# Patient Record
Sex: FEMALE | Race: WHITE | NOT HISPANIC OR LATINO
[De-identification: names, ages, dates, MRNs, and addresses within clinical notes are randomized per-mention and may not be internally consistent; named-entity substitution may affect disease eponyms.]

---

## 2023-04-10 PROBLEM — Z00.00 ENCOUNTER FOR PREVENTIVE HEALTH EXAMINATION: Status: ACTIVE | Noted: 2023-04-10

## 2023-06-01 ENCOUNTER — APPOINTMENT (OUTPATIENT)
Dept: ELECTROPHYSIOLOGY | Facility: CLINIC | Age: 68
End: 2023-06-01
Payer: MEDICARE

## 2023-06-01 ENCOUNTER — NON-APPOINTMENT (OUTPATIENT)
Age: 68
End: 2023-06-01

## 2023-06-01 VITALS
HEART RATE: 76 BPM | HEIGHT: 64 IN | WEIGHT: 137 LBS | SYSTOLIC BLOOD PRESSURE: 110 MMHG | OXYGEN SATURATION: 98 % | DIASTOLIC BLOOD PRESSURE: 64 MMHG | BODY MASS INDEX: 23.39 KG/M2 | TEMPERATURE: 98.3 F

## 2023-06-01 DIAGNOSIS — I47.20 VENTRICULAR TACHYCARDIA, UNSPECIFIED: ICD-10-CM

## 2023-06-01 DIAGNOSIS — I44.7 LEFT BUNDLE-BRANCH BLOCK, UNSPECIFIED: ICD-10-CM

## 2023-06-01 PROCEDURE — 99205 OFFICE O/P NEW HI 60 MIN: CPT

## 2023-06-01 PROCEDURE — 93000 ELECTROCARDIOGRAM COMPLETE: CPT

## 2023-06-01 NOTE — REVIEW OF SYSTEMS
[Palpitations] : palpitations [Syncope] : syncope [Negative] : Heme/Lymph [SOB] : no shortness of breath [Dyspnea on exertion] : not dyspnea during exertion [Chest Discomfort] : no chest discomfort [Lower Ext Edema] : no extremity edema [Leg Claudication] : no intermittent leg claudication [Dizziness] : no dizziness [Convulsions] : no convulsions

## 2023-06-01 NOTE — CARDIOLOGY SUMMARY
[de-identified] : 6/1/21 sinus rhythm 86 bpm, RBBB, LAFB [de-identified] : Stress 4/11/23 normal nuclear stress test. Normal LVEF 78%, LA 3.3cm, trace TR, RVSP <25  [de-identified] : 4/10/23 LVEF 57%, LA 3.3cm, trace TR, RVSP <25

## 2023-06-01 NOTE — HISTORY OF PRESENT ILLNESS
[FreeTextEntry1] : 67 year old woman with history of nephrotic syndrome (IgA nephropathy), RBBB/LAFB, presenting for evaluation of palpitation, PVCs, and NSVT.  \par \par She has noted occasional episodes of palpitation with brief periods of racing HR over the last 12 years.  \par \par More recently over the last few months she has noted sensation of irregular pounding, that can be on and off over the day. .  \par \par Event monitor 3/22- 3/29/23 reveals sinus rhythm with average HR 94 (range  bpm). There was one episode of rapid WCT likely NSVT with rate 162-193 bpm. There was 2.5% PVCs with brief periods of bigeminy and trigeminy, and brief episodes of pAT up to 8 beats. The symptom events did appear to correlate with periods of frequent PVCs, and the PVC morphology was monomorphic, and similar to the morphology seen during NSVT. The PVC burden was variable, ranging from 0.8% to 6%.  \par \par She is bothered by the palpitation, but denies associated dizziness or syncope. She has thought chocolate was a trigger for the PVCs.  \par \par TTE and stress test were normal with LVEF 65% and no significant ischemia.  \par \par She was started on Toprol 25mg qd, but has not noted a difference yet- she will be increasing to 50mg qd.  \par \par  She did have one episode of syncope about 4 years ago that occurred on a hot and stressful day, when she was working a lot- at the end of the day she felt dizzy and passed out. 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  \par \par Of note her father had a cardiomyopathy and had an ICD in his 70s, but had no known ICD shcoks.  \par \par Her ECG today reveals  sinus rhythm 86 bpm with RBBB and LAFB, with no ectopy.  \par \par \par Current meds include Toprol 25, benzonatate and Xanax prn.

## 2023-06-01 NOTE — PHYSICAL EXAM
[Well Developed] : well developed [Well Nourished] : well nourished [No Acute Distress] : no acute distress [Normal Conjunctiva] : normal conjunctiva [Normal Venous Pressure] : normal venous pressure [Normal S1, S2] : normal S1, S2 [No Murmur] : no murmur [Clear Lung Fields] : clear lung fields [Good Air Entry] : good air entry [No Respiratory Distress] : no respiratory distress  [Non Tender] : non-tender [Normal Gait] : normal gait [No Edema] : no edema [No Cyanosis] : no cyanosis [No Clubbing] : no clubbing [Moves all extremities] : moves all extremities [No Focal Deficits] : no focal deficits [Normal Speech] : normal speech [Alert and Oriented] : alert and oriented

## 2023-06-01 NOTE — DISCUSSION/SUMMARY
[EKG obtained to assist in diagnosis and management of assessed problem(s)] : EKG obtained to assist in diagnosis and management of assessed problem(s) [FreeTextEntry1] : 67 year old woman with history of nephrotic syndrome (IgA nephropathy), RBBB/LAFB, presenting for evaluation of palpitation, PVCs, and NSVT. She has had a long history of RBBB and LAFB, and recently has had palpitation that has been intermittently very bothersome, and has been correlated with periods of frequent monomorphic PVCs, as well as an episode of NSVT (likely similar morphology as PVCs). The overall PVC burden has been <6%, but has occurred intermittently with high burden.  \par \par We did discuss PVCs in detail- I explained that these are typically benign and with low risk of associated CMP with burdens <10-20%, however, given her bothersome symptoms, treatment to suppress the PVCs is reasonable. She is hopeful to avoid additional medications, and does not want to take antiarrhythmic medications- given her underlying conduction disease, I agree with avoiding these medication. The option for PVC ablation was discussed, including the risks and benefits of this procedure- she expressed understanding, but is hesitant and wants to consider this further.  \par \par TTE and stress testing have no revealed evidence of cardiomyopathy. 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 Will get a cardiac MRI to evaluate further.  \par \par She did have syncope in the past that was likely not related to her arrhythmia, but further evaluation would be indicated given her bifasicular block. I did recommend ILR implant for arrhythmia monitoring and symptom correlation, but she does want to avoid this. Can repeat noninvasive monitoring in the future. If PVC ablation is performed, and EP study would be useful to evaluate the extent of her conduction disease and evaluate for inducible sustained ventricular arrhythmia.  \par \par -trial increased dose of metoprolol to 50mg qd if tolerated \par \par -cardiac MRI \par -sleep study to evaluate for DESTINY potentially contributing to symptoms and PVCs\par \par -EP followup in a few months, to reconsider EP study, PVC ablation, and possibly ILR implant  \par \par -cardiology followup with Dr Jack

## 2023-06-19 ENCOUNTER — APPOINTMENT (OUTPATIENT)
Dept: MRI IMAGING | Facility: CLINIC | Age: 68
End: 2023-06-19
Payer: MEDICARE

## 2023-06-19 ENCOUNTER — OUTPATIENT (OUTPATIENT)
Dept: OUTPATIENT SERVICES | Facility: HOSPITAL | Age: 68
LOS: 1 days | End: 2023-06-19

## 2023-06-19 DIAGNOSIS — I47.20 VENTRICULAR TACHYCARDIA, UNSPECIFIED: ICD-10-CM

## 2023-06-19 PROCEDURE — 75561 CARDIAC MRI FOR MORPH W/DYE: CPT | Mod: 26

## 2023-07-25 ENCOUNTER — APPOINTMENT (OUTPATIENT)
Dept: PULMONOLOGY | Facility: CLINIC | Age: 68
End: 2023-07-25
Payer: MEDICARE

## 2023-07-25 VITALS
HEIGHT: 64 IN | RESPIRATION RATE: 16 BRPM | OXYGEN SATURATION: 96 % | DIASTOLIC BLOOD PRESSURE: 76 MMHG | HEART RATE: 85 BPM | WEIGHT: 140 LBS | BODY MASS INDEX: 23.9 KG/M2 | SYSTOLIC BLOOD PRESSURE: 124 MMHG

## 2023-07-25 DIAGNOSIS — G47.33 OBSTRUCTIVE SLEEP APNEA (ADULT) (PEDIATRIC): ICD-10-CM

## 2023-07-25 PROCEDURE — 99203 OFFICE O/P NEW LOW 30 MIN: CPT

## 2023-07-25 NOTE — CONSULT LETTER
[Dear  ___] : Dear  [unfilled], [Consult Letter:] : I had the pleasure of evaluating your patient, [unfilled]. [Please see my note below.] : Please see my note below. [Consult Closing:] : Thank you very much for allowing me to participate in the care of this patient.  If you have any questions, please do not hesitate to contact me. [Sincerely,] : Sincerely, [DrNorma  ___] : Dr. OTT no

## 2023-07-25 NOTE — PHYSICAL EXAM
[No Acute Distress] : no acute distress [Enlarged Base of the Tongue] : enlarged base of the tongue [Retrognathia] : retrognathia [Normal Appearance] : normal appearance [No Neck Mass] : no neck mass [Normal Rate/Rhythm] : normal rate/rhythm [Normal S1, S2] : normal s1, s2 [No Resp Distress] : no resp distress [Clear to Auscultation Bilaterally] : clear to auscultation bilaterally [TextBox_11] : High arched palate

## 2023-07-25 NOTE — DISCUSSION/SUMMARY
[FreeTextEntry1] : Assess\par \par Insomnia\par DESTINY\par \par Plan\par \par Sleep hygiene and insomnia review\par WP HST\par 6 week FU

## 2023-07-25 NOTE — HISTORY OF PRESENT ILLNESS
[TextBox_4] : RBBB and LAFB\par Palpitations\par VT\par Arrhythmia in sleep \par Episodic sleep onset or sleep maintenance insomnia\par Good sleep hygiene  [Obstructive Sleep Apnea] : obstructive sleep apnea [Awakes Unrefreshed] : awakes unrefreshed [Awakes with Dry Mouth] : awakes with dry mouth [Daytime Somnolence] : daytime somnolence [Difficulty Maintaining Sleep] : difficulty maintaining sleep [Nonrestorative Sleep] : nonrestorative sleep [Snoring] : snoring

## 2023-08-08 ENCOUNTER — OUTPATIENT (OUTPATIENT)
Dept: OUTPATIENT SERVICES | Facility: HOSPITAL | Age: 68
LOS: 1 days | End: 2023-08-08
Payer: MEDICARE

## 2023-08-08 DIAGNOSIS — G47.33 OBSTRUCTIVE SLEEP APNEA (ADULT) (PEDIATRIC): ICD-10-CM

## 2023-08-08 PROCEDURE — 95800 SLP STDY UNATTENDED: CPT

## 2023-08-08 PROCEDURE — G0400: CPT | Mod: 26

## 2023-08-15 RX ORDER — AZELASTINE HYDROCHLORIDE 137 UG/1
0.1 SPRAY, METERED NASAL TWICE DAILY
Refills: 0 | Status: ACTIVE | COMMUNITY
Start: 2023-08-15

## 2023-08-15 RX ORDER — METOPROLOL SUCCINATE 50 MG/1
50 TABLET, EXTENDED RELEASE ORAL DAILY
Qty: 90 | Refills: 1 | Status: ACTIVE | COMMUNITY

## 2023-08-15 RX ORDER — ALPRAZOLAM 0.5 MG/1
0.5 TABLET ORAL 3 TIMES DAILY
Refills: 0 | Status: ACTIVE | COMMUNITY
Start: 2023-08-15

## 2023-08-16 ENCOUNTER — APPOINTMENT (OUTPATIENT)
Dept: ELECTROPHYSIOLOGY | Facility: CLINIC | Age: 68
End: 2023-08-16
Payer: MEDICARE

## 2023-08-16 VITALS
DIASTOLIC BLOOD PRESSURE: 66 MMHG | BODY MASS INDEX: 23.56 KG/M2 | SYSTOLIC BLOOD PRESSURE: 136 MMHG | HEART RATE: 76 BPM | WEIGHT: 138 LBS | HEIGHT: 64 IN

## 2023-08-16 PROCEDURE — 93000 ELECTROCARDIOGRAM COMPLETE: CPT

## 2023-08-16 PROCEDURE — 99215 OFFICE O/P EST HI 40 MIN: CPT

## 2023-08-16 NOTE — HISTORY OF PRESENT ILLNESS
[FreeTextEntry1] : 68 year old woman with history of nephrotic syndrome (IgA nephropathy), RBBB/LAFB, presenting for followup of palpitation, PVCs, and NSVT.    She has noted occasional episodes of palpitation with brief periods of racing HR over the last 12 years.  She then noted progressive sensation of irregular pounding, that can be on and off over the day. .    Event monitor 3/22- 3/29/23 reveals sinus rhythm with average HR 94 (range  bpm). There was one episode of rapid WCT likely NSVT with rate 162-193 bpm. There was 2.5% PVCs with brief periods of bigeminy and trigeminy, and brief episodes of pAT up to 8 beats. The symptom events did appear to correlate with periods of frequent PVCs, and the PVC morphology was monomorphic, and similar to the morphology seen during NSVT. The PVC burden was variable, ranging from 0.8% to 6%.    She was bothered by the palpitation, but denies associated dizziness or syncope. She has thought chocolate was a trigger for the PVCs.    At last visit Toprol was increased from 25 to 50mg qd. Since increasing this she has noted a marked improvement in palpitation- she now reports very mild symptoms which are brief and not bothersome.   TTE and stress test were normal with LVEF 65% and no significant ischemia.   After last visit a CMR was done, which was normal with normal LV function and no LGE.  She did also have evaluation with sleep medicine, and sleep study was planned but not yet done.   She did have one episode of syncope about 4 years ago that occurred on a hot and stressful day, when she was working a lot- at the end of the day she felt dizzy and passed out. 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    Of note her father had a cardiomyopathy and had an ICD in his 70s, but had no known ICD shocks.    Her ECG today reveals  sinus rhythm 86 bpm with RBBB and LAFB, with no ectopy.     Current meds include Toprol 25, benzonatate and Xanax prn.

## 2023-08-16 NOTE — REVIEW OF SYSTEMS
[Negative] : Heme/Lymph [SOB] : no shortness of breath [Dyspnea on exertion] : not dyspnea during exertion [Chest Discomfort] : no chest discomfort [Lower Ext Edema] : no extremity edema [Leg Claudication] : no intermittent leg claudication [Palpitations] : no palpitations [Syncope] : no syncope [Dizziness] : no dizziness [Convulsions] : no convulsions

## 2023-08-16 NOTE — DISCUSSION/SUMMARY
[FreeTextEntry1] : 68 year old woman with history of nephrotic syndrome (IgA nephropathy), RBBB/LAFB, presenting for followup of palpitation, PVCs, and NSVT. She has had palpitation that has been intermittently very bothersome, and has been correlated with periods of frequent monomorphic PVCs, as well as an episode of NSVT (likely similar morphology as PVCs). The overall PVC burden has been <6%, but has occurred intermittently with high burden.   Workup with TTE and CMR has revealed no structural heart disease.  Since increasing Toprol to 50mg qd, she has noted a marked symptomatic improvement. Given resolution of her more bothersome symptoms, and lack of any detectible structural heart disease, will continue conservative management for now.   We did again discuss PVCs/NSVT in detail- I explained that these are typically benign and with low risk of associated CMP with burdens <10-20%. The option for PVC ablation was again discussed if her symptoms progress in the future. If ablation is performed, EP study would also be useful to evaluate the extent of her conduction disease.   She prefers to avoid ILR at this time,. and will repeat Holter monitoring prior to next visit.   -continue metoprolol to 50mg qd  -sleep study to evaluate for DESTINY potentially contributing to symptoms and PVCs -repeat Holter monitor to followup PVCs and conduction disease -EP followup in 6 months months, to reconsider EP study, PVC ablation, and possibly ILR implant  as needed [EKG obtained to assist in diagnosis and management of assessed problem(s)] : EKG obtained to assist in diagnosis and management of assessed problem(s)

## 2023-08-16 NOTE — CARDIOLOGY SUMMARY
[de-identified] : 8/16/23 sinus rhythm 76 bpm, RBBB, LAFB 6/1/21 sinus rhythm 86 bpm, RBBB, LAFB [de-identified] : Stress 4/11/23 normal nuclear stress test. Normal LVEF 78%, LA 3.3cm, trace TR, RVSP <25  [de-identified] : 4/10/23 LVEF 57%, LA 3.3cm, trace TR, RVSP <25 [de-identified] : CMR 6/19/23-  normal biventricular size and function, no LGE. No evidence of valvular abnormality

## 2023-08-16 NOTE — REASON FOR VISIT
[Arrhythmia/ECG Abnorrmalities] : arrhythmia/ECG abnormalities [FreeTextEntry1] : INCOMPLETE NOTE [FreeTextEntry3] : Dr Jack

## 2023-09-12 ENCOUNTER — APPOINTMENT (OUTPATIENT)
Dept: PULMONOLOGY | Facility: CLINIC | Age: 68
End: 2023-09-12
Payer: MEDICARE

## 2023-09-12 VITALS — HEIGHT: 64 IN | BODY MASS INDEX: 23.73 KG/M2 | WEIGHT: 139 LBS

## 2023-09-12 VITALS
DIASTOLIC BLOOD PRESSURE: 78 MMHG | HEART RATE: 89 BPM | RESPIRATION RATE: 16 BRPM | OXYGEN SATURATION: 98 % | SYSTOLIC BLOOD PRESSURE: 128 MMHG

## 2023-09-12 DIAGNOSIS — G47.00 INSOMNIA, UNSPECIFIED: ICD-10-CM

## 2023-09-12 DIAGNOSIS — R06.83 SNORING: ICD-10-CM

## 2023-09-12 PROCEDURE — 99213 OFFICE O/P EST LOW 20 MIN: CPT

## 2024-02-25 NOTE — PHYSICAL EXAM
[No Acute Distress] : no acute distress [Well Developed] : well developed [Well Nourished] : well nourished [Normal Conjunctiva] : normal conjunctiva [Normal Venous Pressure] : normal venous pressure [No Murmur] : no murmur [Normal S1, S2] : normal S1, S2 [Good Air Entry] : good air entry [Clear Lung Fields] : clear lung fields [No Respiratory Distress] : no respiratory distress  [No Edema] : no edema [Non Tender] : non-tender [Normal Gait] : normal gait [No Cyanosis] : no cyanosis [No Clubbing] : no clubbing [Moves all extremities] : moves all extremities [No Focal Deficits] : no focal deficits [Normal Speech] : normal speech [Alert and Oriented] : alert and oriented

## 2024-02-28 ENCOUNTER — APPOINTMENT (OUTPATIENT)
Dept: ELECTROPHYSIOLOGY | Facility: CLINIC | Age: 69
End: 2024-02-28
Payer: COMMERCIAL

## 2024-02-28 VITALS
HEART RATE: 80 BPM | WEIGHT: 139 LBS | BODY MASS INDEX: 23.73 KG/M2 | HEIGHT: 64 IN | DIASTOLIC BLOOD PRESSURE: 72 MMHG | SYSTOLIC BLOOD PRESSURE: 128 MMHG

## 2024-02-28 PROCEDURE — 99215 OFFICE O/P EST HI 40 MIN: CPT

## 2024-02-28 PROCEDURE — G2211 COMPLEX E/M VISIT ADD ON: CPT

## 2024-02-28 PROCEDURE — 93000 ELECTROCARDIOGRAM COMPLETE: CPT

## 2024-02-28 NOTE — REVIEW OF SYSTEMS
[Negative] : Psychiatric [SOB] : no shortness of breath [Dyspnea on exertion] : not dyspnea during exertion [Chest Discomfort] : no chest discomfort [Lower Ext Edema] : no extremity edema [Leg Claudication] : no intermittent leg claudication [Palpitations] : no palpitations [Syncope] : no syncope [Convulsions] : no convulsions [Dizziness] : no dizziness

## 2024-02-28 NOTE — REASON FOR VISIT
[Arrhythmia/ECG Abnorrmalities] : arrhythmia/ECG abnormalities [FreeTextEntry1] : incomplete note [FreeTextEntry3] : Dr Ying

## 2024-02-28 NOTE — CARDIOLOGY SUMMARY
[de-identified] : 2/28/24 sinus rhythm 80 bpm, RBBB, LAFB 8/16/23 sinus rhythm 76 bpm, RBBB, LAFB 6/1/21 sinus rhythm 86 bpm, RBBB, LAFB [de-identified] : Stress 4/11/23 normal nuclear stress test. Normal LVEF 78%, LA 3.3cm, trace TR, RVSP <25  [de-identified] : 4/10/23 LVEF 57%, LA 3.3cm, trace TR, RVSP <25 [de-identified] : CMR 6/19/23-  normal biventricular size and function, no LGE. No evidence of valvular abnormality

## 2024-02-28 NOTE — DISCUSSION/SUMMARY
[FreeTextEntry1] : 68 year old woman with history of nephrotic syndrome (IgA nephropathy), RBBB/LAFB, presenting for followup of palpitation, PVCs, and NSVT.   She has had palpitation that has been intermittently very bothersome, and has been correlated with periods of frequent monomorphic PVCs, as well as an episode of NSVT (likely similar morphology as PVCs). The overall PVC burden has been <6% (and recent monitor burden was 3.9%), but has occurred intermittently with high burden.   Workup with TTE and CMR has revealed no structural heart disease.  Since increasing Toprol to 50mg qd, she has noted a marked symptomatic improvement. Given resolution of her more bothersome symptoms, and lack of any detectible structural heart disease, will continue conservative management for now.   We did again discuss PVCs/NSVT in detail- I explained that these are typically benign and with low risk of associated CMP with burdens <10-20%. The option for PVC ablation was again discussed if her symptoms progress in the future. If ablation is performed, EP study would also be useful to evaluate the extent of her conduction disease.   She prefers to avoid ILR at this time.  -continue metoprolol to 50mg qd, and magnesium. ok to trial reduced dose of metoprolol if she wants, but stressed would continue given associated symptomatic improvement. -repeat Holter monitor and TTE every 1-2 years to followup PVCs, conduction disease, and r/o CMP -cardiology followup wtih Dr Ying, and EP followup as needed [EKG obtained to assist in diagnosis and management of assessed problem(s)] : EKG obtained to assist in diagnosis and management of assessed problem(s)

## 2024-02-28 NOTE — HISTORY OF PRESENT ILLNESS
[FreeTextEntry1] : 68 year old woman with history of nephrotic syndrome (IgA nephropathy), RBBB/LAFB, presenting for followup of palpitation, PVCs, and NSVT.    She has noted occasional episodes of palpitation with brief periods of racing HR, and sensation of irregular pounding. These have correlated with PVCs, as well as periods of brief NSVT, which previously ranged up to 6% burden in the past.  She was previously bothered by the palpitation, but denies associated dizziness or syncope. She has remained on Toprol 50mg qd, and magnesium supplement. Since increasing the dose of Toprol she had noted a marked improvement in palpitation- she now reports very mild symptoms which are brief and not bothersome.  A recent event monitor was performed 1/8-1/11/24 and revealed sinus rhythm (avg 87, range  bpm) with 3.9% PVCs, including brief episodes of bigeminy and trigeminy, and brief episodes of pAT upto 5 beats.   A prior Event monitor 3/22- 3/29/23 revealed sinus rhythm with average HR 94 (range  bpm), with one episode of rapid WCT likely NSVT with rate 162-193 bpm, and 2.5% PVCs with brief periods of bigeminy and trigeminy, and brief episodes of pAT up to 8 beats. The PVC burden was variable, ranging from 0.8% to 6%.   Her prior  symptom events did appear to correlate with periods of frequent PVCs, and the PVC morphology was monomorphic, and similar to the morphology seen during NSVT.   TTE and stress test were normal with LVEF 65% and no significant ischemia.  A  CMR 6/2023 was normal with normal LV function and no LGE.  She has seen sleep medicine and sleep hygeine has been reviewed/recommended.   She did have one episode of syncope about 4 years ago that occurred on a hot and stressful day, when she was working a lot- at the end of the day she felt dizzy and passed out. 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  An ILR was considered but she deferred this.   Of note her father had a cardiomyopathy and had an ICD in his 70s, but had no known ICD shocks.    Her ECG today reveals  sinus rhythm 80 bpm with RBBB and LAFB, with no ectopy.    Current meds include Toprol 50mg qd, benzonatate and Xanax prn.

## 2024-04-22 ENCOUNTER — APPOINTMENT (OUTPATIENT)
Dept: RADIOLOGY | Facility: CLINIC | Age: 69
End: 2024-04-22
Payer: MEDICARE

## 2024-04-22 PROCEDURE — 71046 X-RAY EXAM CHEST 2 VIEWS: CPT
